# Patient Record
Sex: FEMALE | Race: BLACK OR AFRICAN AMERICAN | URBAN - METROPOLITAN AREA
[De-identification: names, ages, dates, MRNs, and addresses within clinical notes are randomized per-mention and may not be internally consistent; named-entity substitution may affect disease eponyms.]

---

## 2022-06-17 ENCOUNTER — OUT OF OFFICE VISIT (OUTPATIENT)
Dept: URBAN - METROPOLITAN AREA MEDICAL CENTER 12 | Facility: MEDICAL CENTER | Age: 57
End: 2022-06-17
Payer: SELF-PAY

## 2022-06-17 DIAGNOSIS — D64.89 ANEMIA DUE TO OTHER CAUSE: ICD-10-CM

## 2022-06-17 PROCEDURE — 99254 IP/OBS CNSLTJ NEW/EST MOD 60: CPT | Performed by: INTERNAL MEDICINE

## 2022-06-29 ENCOUNTER — OUT OF OFFICE VISIT (OUTPATIENT)
Dept: URBAN - METROPOLITAN AREA MEDICAL CENTER 12 | Facility: MEDICAL CENTER | Age: 57
End: 2022-06-29
Payer: SELF-PAY

## 2022-06-29 DIAGNOSIS — R19.5 ABNORMAL CONSISTENCY OF STOOL: ICD-10-CM

## 2022-06-29 DIAGNOSIS — D64.89 ANEMIA DUE TO OTHER CAUSE: ICD-10-CM

## 2022-06-29 PROCEDURE — 99232 SBSQ HOSP IP/OBS MODERATE 35: CPT | Performed by: INTERNAL MEDICINE

## 2024-09-16 ENCOUNTER — CLAIMS CREATED FROM THE CLAIM WINDOW (OUTPATIENT)
Dept: URBAN - METROPOLITAN AREA MEDICAL CENTER 12 | Facility: MEDICAL CENTER | Age: 59
End: 2024-09-16
Payer: SELF-PAY

## 2024-09-16 DIAGNOSIS — R19.7 ACUTE DIARRHEA: ICD-10-CM

## 2024-09-16 DIAGNOSIS — R93.3 ABN FINDINGS-GI TRACT: ICD-10-CM

## 2024-09-16 DIAGNOSIS — K92.1 HEMATOCHEZIA: ICD-10-CM

## 2024-09-16 DIAGNOSIS — D64.89 ANEMIA DUE TO OTHER CAUSE: ICD-10-CM

## 2024-09-16 PROCEDURE — 99254 IP/OBS CNSLTJ NEW/EST MOD 60: CPT | Performed by: INTERNAL MEDICINE

## 2024-09-17 ENCOUNTER — CLAIMS CREATED FROM THE CLAIM WINDOW (OUTPATIENT)
Dept: URBAN - METROPOLITAN AREA MEDICAL CENTER 12 | Facility: MEDICAL CENTER | Age: 59
End: 2024-09-17
Payer: SELF-PAY

## 2024-09-17 DIAGNOSIS — R93.3 ABN FINDINGS-GI TRACT: ICD-10-CM

## 2024-09-17 DIAGNOSIS — D64.89 ANEMIA DUE TO OTHER CAUSE: ICD-10-CM

## 2024-09-17 DIAGNOSIS — K92.1 HEMATOCHEZIA: ICD-10-CM

## 2024-09-17 DIAGNOSIS — R19.7 ACUTE DIARRHEA: ICD-10-CM

## 2024-09-17 PROCEDURE — 99232 SBSQ HOSP IP/OBS MODERATE 35: CPT | Performed by: INTERNAL MEDICINE

## 2024-09-18 ENCOUNTER — CLAIMS CREATED FROM THE CLAIM WINDOW (OUTPATIENT)
Dept: URBAN - METROPOLITAN AREA MEDICAL CENTER 12 | Facility: MEDICAL CENTER | Age: 59
End: 2024-09-18
Payer: SELF-PAY

## 2024-09-18 DIAGNOSIS — R19.7 ACUTE DIARRHEA: ICD-10-CM

## 2024-09-18 DIAGNOSIS — D64.89 ANEMIA DUE TO OTHER CAUSE: ICD-10-CM

## 2024-09-18 DIAGNOSIS — R93.3 ABN FINDINGS-GI TRACT: ICD-10-CM

## 2024-09-18 DIAGNOSIS — K92.1 HEMATOCHEZIA: ICD-10-CM

## 2024-09-18 PROCEDURE — 99232 SBSQ HOSP IP/OBS MODERATE 35: CPT | Performed by: INTERNAL MEDICINE

## 2024-10-17 ENCOUNTER — OFFICE VISIT (OUTPATIENT)
Dept: URBAN - METROPOLITAN AREA CLINIC 92 | Facility: CLINIC | Age: 59
End: 2024-10-17

## 2024-10-17 NOTE — HPI-TODAY'S VISIT:
59-year-old female patient presents today for hospital visit follow-up.  She has a past medical history of HIV, trichomonas in August 2024, hypertension, uncontrolled diabetes type 2 complicated by peripheral neuropathy, history of CVA with left-sided deficits, cocaine abuse, nicotine dependence, medicine noncompliance and housing insecurity.  She presented to [facility] on 9- due to 3 weeks of productive cough, vaginal discharge and itching, abdominal pain, 2 weeks of diarrhea and bilateral foot pain.  GI was consulted for abnormal imaging.  She noted that she had diarrhea for about 2 weeks and has bowel movement shortly after eating.  Endorsed red blood in the stool that occurred 2 days ago and intermittent bloody stool for quite some time.  She denied a prior colonoscopy and when discussing the procedure patient to cry she was nervous about the procedure.  Patient was also seen by [facility] and June 2022 due to anemia however deferred EGD and colon.  CT scan demonstrated marked segmental wall thickening of the sigmoid extending over approximately 8.5 cm in length could be due to diverticular disease or mass consider colonoscopy, gallstones no acute cholecystitis.  Stool study was negative for C. difficile, parasites for viral infection, CEA within normal limits patient had normocytic anemia and was told to start PPI twice daily.  Patient was supposed to have a colonoscopy however patient left AMA.

## 2024-11-15 ENCOUNTER — OFFICE VISIT (OUTPATIENT)
Dept: URBAN - METROPOLITAN AREA CLINIC 92 | Facility: CLINIC | Age: 59
End: 2024-11-15

## 2025-01-28 ENCOUNTER — P2P PATIENT RECORD (OUTPATIENT)
Age: 60
End: 2025-01-28